# Patient Record
Sex: MALE | Race: ASIAN | NOT HISPANIC OR LATINO | URBAN - METROPOLITAN AREA
[De-identification: names, ages, dates, MRNs, and addresses within clinical notes are randomized per-mention and may not be internally consistent; named-entity substitution may affect disease eponyms.]

---

## 2017-04-22 ENCOUNTER — EMERGENCY (EMERGENCY)
Facility: HOSPITAL | Age: 32
LOS: 1 days | Discharge: HOME | End: 2017-04-22
Attending: EMERGENCY MEDICINE | Admitting: EMERGENCY MEDICINE
Payer: SELF-PAY

## 2017-04-22 VITALS
HEART RATE: 73 BPM | RESPIRATION RATE: 16 BRPM | TEMPERATURE: 98 F | DIASTOLIC BLOOD PRESSURE: 82 MMHG | OXYGEN SATURATION: 97 % | SYSTOLIC BLOOD PRESSURE: 125 MMHG

## 2017-04-22 DIAGNOSIS — F10.120 ALCOHOL ABUSE WITH INTOXICATION, UNCOMPLICATED: ICD-10-CM

## 2017-04-22 DIAGNOSIS — R41.82 ALTERED MENTAL STATUS, UNSPECIFIED: ICD-10-CM
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
## 2017-04-22 LAB — ETHANOL SERPL-MCNC: 308 MG/DL — HIGH

## 2017-04-22 PROCEDURE — 99284 EMERGENCY DEPT VISIT MOD MDM: CPT

## 2017-04-22 RX ORDER — SODIUM CHLORIDE 9 MG/ML
1000 INJECTION INTRAMUSCULAR; INTRAVENOUS; SUBCUTANEOUS ONCE
Refills: 0 | Status: COMPLETED | OUTPATIENT
Start: 2017-04-22 | End: 2017-04-22

## 2017-04-22 RX ADMIN — SODIUM CHLORIDE 1000 MILLILITER(S): 9 INJECTION INTRAMUSCULAR; INTRAVENOUS; SUBCUTANEOUS at 22:06

## 2017-04-22 NOTE — ED PROVIDER NOTE - MEDICAL DECISION MAKING DETAILS
Patient BIBEMS for suspected alcohol intoxication.  History and ROS limited due to altered state.  No evidence of head or extremity trauma.  No vital sign derangements.  Abdomen not distended.  Observe to clinical sobriety.  BAL, IVF ordered.  No previous visits but AVPU = verbal

## 2017-04-22 NOTE — ED PROVIDER NOTE - OBJECTIVE STATEMENT
BIBEMS for AMS - found sleeping in restaurant and admitted to drinking alcohol.  States "I'm sorry".  History and ROS limited.  AVPU = verbal, no previous visits.

## 2017-04-22 NOTE — ED ADULT TRIAGE NOTE - CHIEF COMPLAINT QUOTE
Pt found sitting on the chair in [employer] with vomit on his person. Pt awake at triage, admits to drinking alcohol. Denies illicit drug use.

## 2017-04-22 NOTE — ED PROVIDER NOTE - PROGRESS NOTE DETAILS
.  Patient now ambulatory with steady gait and clear speech.  Observed in the ED until clinical sobriety.  Safe discharge planning discussed with patient.  Alcohol cessation discussed with patient.

## 2017-04-23 VITALS
RESPIRATION RATE: 18 BRPM | OXYGEN SATURATION: 98 % | SYSTOLIC BLOOD PRESSURE: 127 MMHG | HEART RATE: 81 BPM | DIASTOLIC BLOOD PRESSURE: 74 MMHG

## 2019-04-13 ENCOUNTER — EMERGENCY (EMERGENCY)
Facility: HOSPITAL | Age: 34
LOS: 1 days | Discharge: HOME | End: 2019-04-13
Admitting: EMERGENCY MEDICINE
Payer: SELF-PAY

## 2019-04-13 VITALS
HEART RATE: 85 BPM | SYSTOLIC BLOOD PRESSURE: 122 MMHG | DIASTOLIC BLOOD PRESSURE: 82 MMHG | OXYGEN SATURATION: 96 % | RESPIRATION RATE: 16 BRPM | TEMPERATURE: 98 F

## 2019-04-13 VITALS
RESPIRATION RATE: 18 BRPM | TEMPERATURE: 98 F | SYSTOLIC BLOOD PRESSURE: 115 MMHG | OXYGEN SATURATION: 95 % | DIASTOLIC BLOOD PRESSURE: 82 MMHG | HEART RATE: 86 BPM

## 2019-04-13 DIAGNOSIS — F10.129 ALCOHOL ABUSE WITH INTOXICATION, UNSPECIFIED: ICD-10-CM

## 2019-04-13 DIAGNOSIS — R41.82 ALTERED MENTAL STATUS, UNSPECIFIED: ICD-10-CM

## 2019-04-13 LAB
ALBUMIN SERPL ELPH-MCNC: 3.9 G/DL — SIGNIFICANT CHANGE UP (ref 3.4–5)
ALP SERPL-CCNC: 59 U/L — SIGNIFICANT CHANGE UP (ref 40–120)
ALT FLD-CCNC: 37 U/L — SIGNIFICANT CHANGE UP (ref 12–42)
ANION GAP SERPL CALC-SCNC: 12 MMOL/L — SIGNIFICANT CHANGE UP (ref 9–16)
AST SERPL-CCNC: 24 U/L — SIGNIFICANT CHANGE UP (ref 15–37)
BILIRUB SERPL-MCNC: 0.2 MG/DL — SIGNIFICANT CHANGE UP (ref 0.2–1.2)
BUN SERPL-MCNC: 11 MG/DL — SIGNIFICANT CHANGE UP (ref 7–23)
CALCIUM SERPL-MCNC: 8.2 MG/DL — LOW (ref 8.5–10.5)
CHLORIDE SERPL-SCNC: 108 MMOL/L — SIGNIFICANT CHANGE UP (ref 96–108)
CO2 SERPL-SCNC: 24 MMOL/L — SIGNIFICANT CHANGE UP (ref 22–31)
CREAT SERPL-MCNC: 0.63 MG/DL — SIGNIFICANT CHANGE UP (ref 0.5–1.3)
ETHANOL SERPL-MCNC: 290 MG/DL — HIGH
GLUCOSE SERPL-MCNC: 114 MG/DL — HIGH (ref 70–99)
HCT VFR BLD CALC: 40.8 % — SIGNIFICANT CHANGE UP (ref 39–50)
HGB BLD-MCNC: 13.1 G/DL — SIGNIFICANT CHANGE UP (ref 13–17)
MCHC RBC-ENTMCNC: 29.2 PG — SIGNIFICANT CHANGE UP (ref 27–34)
MCHC RBC-ENTMCNC: 32.1 G/DL — SIGNIFICANT CHANGE UP (ref 32–36)
MCV RBC AUTO: 91.1 FL — SIGNIFICANT CHANGE UP (ref 80–100)
PLATELET # BLD AUTO: 181 K/UL — SIGNIFICANT CHANGE UP (ref 150–400)
POTASSIUM SERPL-MCNC: 3.8 MMOL/L — SIGNIFICANT CHANGE UP (ref 3.5–5.3)
POTASSIUM SERPL-SCNC: 3.8 MMOL/L — SIGNIFICANT CHANGE UP (ref 3.5–5.3)
PROT SERPL-MCNC: 7.6 G/DL — SIGNIFICANT CHANGE UP (ref 6.4–8.2)
RBC # BLD: 4.48 M/UL — SIGNIFICANT CHANGE UP (ref 4.2–5.8)
RBC # FLD: 13.2 % — SIGNIFICANT CHANGE UP (ref 10.3–14.5)
SODIUM SERPL-SCNC: 144 MMOL/L — SIGNIFICANT CHANGE UP (ref 132–145)
WBC # BLD: 4.4 K/UL — SIGNIFICANT CHANGE UP (ref 3.8–10.5)
WBC # FLD AUTO: 4.4 K/UL — SIGNIFICANT CHANGE UP (ref 3.8–10.5)

## 2019-04-13 PROCEDURE — 99284 EMERGENCY DEPT VISIT MOD MDM: CPT | Mod: 25

## 2019-04-13 PROCEDURE — 70450 CT HEAD/BRAIN W/O DYE: CPT | Mod: 26

## 2019-04-13 PROCEDURE — 99053 MED SERV 10PM-8AM 24 HR FAC: CPT

## 2019-04-13 NOTE — ED ADULT NURSE NOTE - OBJECTIVE STATEMENT
Pt is a 33y male complaining of mental status. Pt vomited with EMS. Uncooperative on arrival. Unable to get history. No obvious signs of trauma.

## 2019-04-13 NOTE — ED PROVIDER NOTE - CLINICAL SUMMARY MEDICAL DECISION MAKING FREE TEXT BOX
32 y/o M presents to ED with AMS.  VSS,  no overt signs of trauma.  , other labs wnl.  CT head nl.  will observe in ED and reassess.

## 2019-04-13 NOTE — ED ADULT NURSE NOTE - NSIMPLEMENTINTERV_GEN_ALL_ED
Implemented All Fall Risk Interventions:  [place] to call system. Call bell, personal items and telephone within reach. Instruct patient to call for assistance. Room bathroom lighting operational. Non-slip footwear when patient is off stretcher. Physically safe environment: no spills, clutter or unnecessary equipment. Stretcher in lowest position, wheels locked, appropriate side rails in place. Provide visual cue, wrist band, yellow gown, etc. Monitor gait and stability. Monitor for mental status changes and reorient to person, place, and time. Review medications for side effects contributing to fall risk. Reinforce activity limits and safety measures with patient and family.

## 2019-04-13 NOTE — ED ADULT NURSE NOTE - CHIEF COMPLAINT QUOTE
BIBA, picked up from [facility] for alcohol intoxication. Vomited en route. Uncooperative on arrival. Unable to obtain VS at this time

## 2019-04-13 NOTE — ED PROVIDER NOTE - OBJECTIVE STATEMENT
34 y/o M presents to ED via [facility] for AMS.  Per triage note, pt found outside.  HPI and ROS limited secondary to aMS 32 y/o M presents to ED via [facility] for AMS.  Per triage note, pt found outside.  HPI and ROS limited secondary to AMS.

## 2019-04-13 NOTE — ED ADULT TRIAGE NOTE - CHIEF COMPLAINT QUOTE
BIBA, picked up from [facility] for alcohol intoxication. Vomited en route. BIBA, picked up from [facility] for alcohol intoxication. Vomited en route. Uncooperative on arrival. Unable to obtain VS at this time

## 2019-12-30 NOTE — ED ADULT NURSE NOTE - PRO INTERPRETER NEED 2
Hospitalist Progress Note      PCP: Sophia Avilez MD    Date of Admission: 12/26/2019    Chief Complaint: Shortness of breath, swollen lower and upper extremity    Hospital Course: See H&P    Subjective:   Patient is up in bed, comfortable, not in distress. Denies any chest pain or shortness of breath. No new event overnight noted. Medications:  Reviewed    Infusion Medications   Scheduled Medications    metoprolol succinate  150 mg Oral Daily    potassium chloride  20 mEq Oral BID    aspirin  81 mg Oral Once    atorvastatin  40 mg Oral Nightly    cloNIDine  0.2 mg Oral BID    diltiazem  240 mg Oral Daily    famotidine  20 mg Oral Daily    levothyroxine  88 mcg Oral Daily    losartan  100 mg Oral Daily    rivaroxaban  15 mg Oral Daily with breakfast    sodium chloride flush  10 mL Intravenous 2 times per day    furosemide  40 mg Intravenous BID     PRN Meds: melatonin, sodium chloride flush, magnesium hydroxide, acetaminophen, prochlorperazine, perflutren lipid microspheres      Intake/Output Summary (Last 24 hours) at 12/30/2019 1030  Last data filed at 12/30/2019 0850  Gross per 24 hour   Intake 1120 ml   Output 2650 ml   Net -1530 ml       Physical Exam Performed:    BP (!) 143/83   Pulse 70   Temp 97.8 °F (36.6 °C)   Resp 16   Ht 5' 3\" (1.6 m)   Wt 164 lb 4.8 oz (74.5 kg)   SpO2 93%   BMI 29.10 kg/m²     General appearance: No apparent distress, appears stated age and cooperative. HEENT: Pupils equal, round, and reactive to light. Conjunctivae/corneas clear. Neck: Supple, with full range of motion. No jugular venous distention. Trachea midline. Respiratory:  Normal respiratory effort. Clear to auscultation, bilaterally without Rales/Wheezes/Rhonchi. Cardiovascular: Regular rate and rhythm with normal S1/S2 without murmurs, rubs or gallops. Abdomen: Soft, non-tender, non-distended with normal bowel sounds. Musculoskeletal: No clubbing, cyanosis , 3+edema bilaterally. English